# Patient Record
Sex: FEMALE | Race: WHITE | HISPANIC OR LATINO | URBAN - METROPOLITAN AREA
[De-identification: names, ages, dates, MRNs, and addresses within clinical notes are randomized per-mention and may not be internally consistent; named-entity substitution may affect disease eponyms.]

---

## 2020-10-13 ENCOUNTER — EMERGENCY (EMERGENCY)
Facility: HOSPITAL | Age: 55
LOS: 1 days | Discharge: HOME | End: 2020-10-13
Attending: EMERGENCY MEDICINE
Payer: SELF-PAY

## 2020-10-13 VITALS
OXYGEN SATURATION: 99 % | DIASTOLIC BLOOD PRESSURE: 82 MMHG | RESPIRATION RATE: 20 BRPM | HEART RATE: 80 BPM | SYSTOLIC BLOOD PRESSURE: 126 MMHG | TEMPERATURE: 99 F

## 2020-10-13 PROCEDURE — T1013: CPT

## 2020-10-13 PROCEDURE — 99283 EMERGENCY DEPT VISIT LOW MDM: CPT

## 2020-10-13 PROCEDURE — 99282 EMERGENCY DEPT VISIT SF MDM: CPT

## 2020-10-13 NOTE — ED PROVIDER NOTE - NOSE FINDINGS
CLOTTED NASAL BLOOD/b/l nares erythematous with clot noted in left nostrils on the anterior aspect and small non bleeding linear opening in inferior vestibule. No active bleeding at this time. No bleeding noted in back of throat./INFLAMMATION/EPISTAXIS

## 2020-10-13 NOTE — ED PROVIDER NOTE - CLINICAL SUMMARY MEDICAL DECISION MAKING FREE TEXT BOX
56 y/o F with no PMHx/PSHx presenting with epistaxis of left nare since 0700 this am. No trauma or AC. Bleeding is well controlled at this time and only a small non bleeding fissure noted in the inferior vestibule of the left nostril. Will have patient follow up with [facility] outpatient. 54 y/o F with no PMHx/PSHx presenting with epistaxis of left nare since 0700 this am. No trauma or AC. Bleeding is well controlled at this time and only a small non bleeding fissure noted in the anterior aspect of the inferior vestibule of the left nostril and no bleeding noted in the posterior pharynx. Will have patient follow up with [facility] outpatient.

## 2020-10-13 NOTE — ED PROVIDER NOTE - NS_EDPROVIDERDISPOUSERTYPE_ED_A_ED
This history is dictated on [employer] word recognition program. There are word recognition mistakes that are occasionally missed on review.  Present History:  Patient presents for follow-up for his multiple sclerosis.  Is presently on amitriptyline 25 mg at night but no immunomodulatory treatment.  He complains of persistent right leg weakness with aggravation by fatigue. He continues to have some weakness on walking particularly with dorsiflexion and plantar flexion of the right foot.  He continues to have tremor of the right hand.  His last MRI was in 3-3 1-17 with a multiple brain and cervical lesions.  Patient inquires as to whether he should go back on immunomodulator therapy.  We will make this decision based on MRI scan     Previous note:  10-17-18:   patient presents today for follow-up for his multiple sclerosis.  He continues to limp somewhat on the right leg however overall feels good.  Jitendra baires in stays quite active.  His last visit on 3-20-17 showed MRI brain and cervical spine were stable.  He still not interested in any immunomodulatory medication.  He does have trouble sleeping at night sometimes and has been on amitriptyline 25 mg HS.  We discussed am increasing the dosage as needed.  Previous note:  3-20-17:  Patient presents for follow-up for his multiple sclerosis after almost 2 years.  He is concerned over the fact that he has had increasing problems with balance, double vision   and generally feeling more fatigued suggesting exacerbation over the last month or 2.  He has been off immunomodulatory therapy for 10 years.  He took Avonex for 7 years in the past.  His last MRI scan was in May 2012 which showed multiple brain and cervical lesions.  He has concerned over progression now and wishes to start therapy if it is not too late.  He is presently on Social Security disability and not able to work particularly due to the balance problems.     See old notes from previous  practice below:       Neurological Exam:  Mental status-alert and oriented to person, place, and time; attention span and concentration is good. Fund of knowledge-patient is aware of current events and able to give detailed history of the current problem.recent and remote memory seems intact. Language function is normal with no evidence of aphasia     Cranial nerves:Visual acuity to hand chart -normal; visual fields to confrontation normal;pupils were equal and reactive to light ; funduscopic examination was normal with sharp disc margins. 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  On right lateral gaze he has a few beats of nystagmus in the right eye with the left eye not coming over medially.  On left lateral gaze the right eye does not come pass midline but there is no nystagmus in the left eye.. Facial sensation to pinprick and corneal reflexes intact; Facial muscles were symmetrical. Hearing is unimpaired symmetrical finger rub; Tongue and palate movements were normal with swallowing unimpaired. Shoulder shrug was intact with good strength Speech was normal     Motor examination: Upper -normal; Lower extremities - right leg proximal-good strength; dorsiflexors plantar flexor weakness if under 3/5;;muscle tone was normal ; right-handed  Sensory examination:Upper and lower extremities - Pinprick and soft touch were normal and symmetrical.   Vibration sense less than 10 seconds at toes  Deep tendon reflexes hyperreflexia with the right ankle clonus;. Both plantar responses were flexor  Cerebellar examination upper: Normal finger to nose and rapid alternating movements  Gait: Unsteady gait; limps on right leg  Romberg test: Positive     Tandem gait: Poor  Involuntary movements: Mild postural tremor; endpoint cerebellar tremor in the right hand  Cervical examination: Full range of motion with no pain Cervical tenderness:negative  Lumbar examination: Low back  tenderness-negative Sciatic notch tenderness-negative Straight leg raising test-negative     Impression: Multiple sclerosis ; right leg weakness; suggestion of AB in the past; postural tremor  Recommendations/plan:  Again Long discussion with the patient in terms of immunologic medication; discussed options in terms of MRI findings on ordered scans..    will order MRI cervical and brain; follow-up 1 year               Attending Attestation (For Attendings USE Only)...

## 2020-10-13 NOTE — ED PROVIDER NOTE - PROGRESS NOTE DETAILS
EA: Reassessment of patient confirms no bleeding at this time. Will have patient follow up outpatient for further evaluation. Discussed with patient, findings, plan of care, follow up, and return precautions. Patient verbalizes understanding and agrees with plan of care.

## 2020-10-13 NOTE — ED PROVIDER NOTE - OBJECTIVE STATEMENT
54 y/o F with no PMH/PSHx and NKDA presenting with intermittent episodes of epistaxis of the left nostril x 2 weeks with most recent episode this morning at 0700. Bleeding has stopped and is controlled at this time. She only takes vitamins and no herbal supplementation. She has not seen [facility] for this issue, denies any trauma, AC, dizziness, palpitations, headaches, sinus pressure, or swallowing blood.

## 2020-10-13 NOTE — ED PROVIDER NOTE - PATIENT PORTAL LINK FT
You can access the FollowMyHealth Patient Portal offered by [facility] by registering at the following website: [email or web address]. By joining [email or web address]’s FollowMyHealth portal, you will also be able to view your health information using other applications (apps) compatible with our system.

## 2020-10-13 NOTE — ED PROVIDER NOTE - NSFOLLOWUPINSTRUCTIONS_ED_ALL_ED_FT
FOLLOW UP WITH PRIMARY CARE PROVIDER IN 1-3 DAY S    FOLLOW UP WITH EAR NOSE AND THROAT SPECIALIST WITHIN 1 WEEK     RETURN TO ER SOONER IF YOU DEVELOP ANY WORSENING SYMPTOMS, BLEEDING, DIZZINESS, PALPITATIONS, OR CONCERNS.    SEGUIMIENTO CON EL PROVEEDOR DE CUIDADO PRIMARIO EN 1-3 DÍAS    SEGUIMIENTO CON EL ESPECIALISTA EN OÍDOS Y GARGANTA DENTRO DE 1 SEMANA    REGRESE A MÁS PRONTO SI DESARROLLA ALGÚN SÍNTOMA QUE EMPLEA, SANGRADO, MAREOS, PALPITACIONES O PREOCUPACIONES Epistaxis    Epistaxis is the medical term for a nosebleed. Nosebleeds are common and can be caused by many conditions, such as injury, infections, dry environments, medicines, nose picking, and home heating and cooling systems. Try controlling your nosebleed by pinching your nose continuously for at least 10 minutes. Avoid lying down while you are having a nosebleed. Sit up and lean forward. Avoid blowing or sniffing your nose for a number of hours after having a nosebleed. Resume your normal activities as you are able, but avoid straining, lifting, or bending at the waist for several days. Maintain humidity in your home by using less air conditioning or by using a humidifier.     If your nose was packed by your health care provider, keep the packing inside of your nose until a health care provider removes it. If a balloon catheter was used to pack your nose, do not cut or remove it unless your health care provider has instructed you to do that.     Aspirin and blood thinners make bleeding more likely. If you are prescribed these medicines and you suffer from nosebleeds, ask your health care provider if you should stop taking the medicines or adjust the dose. Do not stop medicines unless directed by your health care provider.    SEEK IMMEDIATE MEDICAL CARE IF YOU HAVE ANY OF THE FOLLOWING SYMPTOMS: nosebleed lasting longer than 20 minutes, unusual bleeding from or bruising on other parts of your body, dizziness or lightheadedness, fainting, nosebleed occurring after a head injury, or fever.      FOLLOW UP WITH PRIMARY CARE PROVIDER IN 1-3 DAY S    FOLLOW UP WITH EAR NOSE AND THROAT SPECIALIST WITHIN 1 WEEK     SEGUIMIENTO CON EL PROVEEDOR DE CUIDADO PRIMARIO EN 1-3 DÍAS    SEGUIMIENTO CON EL ESPECIALISTA EN OÍDOS Y GARGANTA DENTRO DE 1 SEMANA    REGRESE A MÁS PRONTO SI DESARROLLA ALGÚN SÍNTOMA QUE EMPLEA, SANGRADO, MAREOS, PALPITACIONES O PREOCUPACIONES.    Epistaxis    Epistaxis es el término médico para brenda hemorragia nasal. Las hemorragias nasales son comunes y pueden ser causadas por muchas afecciones, david lesiones, infecciones, ambientes secos, medicamentos, hurgarse la nariz y sistemas de calefacción y aire acondicionado en el hogar. Intente controlar leon hemorragia nasal pellizcándose la nariz continuamente naman al menos 10 minutos. Evite acostarse mientras le sangra la nariz. Siéntese e inclínese hacia adelante. Evite sonarse u oler leon nariz naman varias horas después de feliz tenido brenda hemorragia nasal. Reanude yanet actividades normales a medida que pueda, ry evite esforzarse, levantar objetos o doblar la cintura naman varios días. Mantenga la humedad en leon hogar usando menos aire acondicionado o usando un humidificador.    Si leon proveedor de atención médica le tapó la nariz, mantenga el empaque dentro de la nariz hasta que un proveedor de atención médica se lo quite. Si se utilizó un catéter con balón para taponar la nariz, no lo enrico ni lo retire a menos que leon proveedor de atención médica le haya indicado que lo adriana.    La aspirina y los anticoagulantes aumentan la probabilidad de hemorragia. Si le recetan estos medicamentos y sufre hemorragias nasales, pregunte a leon proveedor de atención médica si debe dejar de tomarlos o ajustar la dosis. No suspenda los medicamentos a menos que se lo indique leon proveedor de atención médica.    BUSQUE ATENCIÓN MÉDICA INMEDIATA SI TIENE ALGUNO DE LOS SIGUIENTES SÍNTOMAS: sangrado nasal que dura más de 20 minutos, sangrado inusual o hematomas en otras partes del cuerpo, mareos o aturdimiento, desmayos, hemorragia nasal después de brenda lesión en la moses o fiebre.

## 2020-10-13 NOTE — ED PROVIDER NOTE - ATTENDING CONTRIBUTION TO CARE
55 year old female no PMHx c/o atraumatic epistaxis for few hours. PE: left nostril mild bleeding, no obvious source identified. I&P: epistaxis, resolved with direct pressure, ENT follow up

## 2020-10-13 NOTE — ED PROVIDER NOTE - CARE PROVIDER_API CALL
Blayne Calixto  OTOLARYNGOLOGY  [address] [place] [address], [place] [address]  Phone: [phone]  Fax: [phone]  Follow Up Time: 1-3 Days
